# Patient Record
(demographics unavailable — no encounter records)

---
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
## 2025-04-19 NOTE — HISTORY OF PRESENT ILLNESS
[TextBox_4] :  year old patient presents for evaluation of  unprovoked DVT in the left tibial vein          Primary doctor is     PSH:         PMH:             SH:   never smoker   former smoker   active smoker     ETOH:  occasional     Occupation: retired, worked as   No exposure to chemicals, dust, asbestos, mold        ALLERGY:   NKDA   allergic to   Reaction is   environmental/seasonal allergy:       Review of Systems:   No rash, skin problems No dry eyes no mouth ulcers no sinusitis, sinus infections, nasal obstruction no dysphagia no dry mouth   no arthritis no joint aches no joint swelling     no pneumonia no wheeze no lung cancer   no CAD no MI no chest pain no murmur no CHF no HTN no edema   no peptic ulcer or gastritis no GERD no abdominal pain no liver disease   no Diabetes no thyroid disease no hyperlipidemia     no bleeding   no DVT or PE   no kidney disease   no stroke no seizure